# Patient Record
Sex: MALE | Race: OTHER | HISPANIC OR LATINO | URBAN - METROPOLITAN AREA
[De-identification: names, ages, dates, MRNs, and addresses within clinical notes are randomized per-mention and may not be internally consistent; named-entity substitution may affect disease eponyms.]

---

## 2019-09-23 PROBLEM — 426867001 ANORECTAL DISORDER: Status: ACTIVE | Noted: 2019-09-23

## 2019-09-23 PROBLEM — 30037006: Status: ACTIVE | Noted: 2019-09-23

## 2022-06-16 ENCOUNTER — CLAIMS CREATED FROM THE CLAIM WINDOW (OUTPATIENT)
Dept: URBAN - METROPOLITAN AREA CLINIC 33 | Facility: CLINIC | Age: 67
End: 2022-06-16
Payer: MEDICARE

## 2022-06-16 VITALS
WEIGHT: 168 LBS | HEART RATE: 78 BPM | DIASTOLIC BLOOD PRESSURE: 76 MMHG | SYSTOLIC BLOOD PRESSURE: 122 MMHG | OXYGEN SATURATION: 97 % | BODY MASS INDEX: 25.46 KG/M2 | HEIGHT: 68 IN

## 2022-06-16 DIAGNOSIS — R68.81 EARLY SATIETY: ICD-10-CM

## 2022-06-16 DIAGNOSIS — R12 HEARTBURN: ICD-10-CM

## 2022-06-16 DIAGNOSIS — R10.84 GENERALIZED ABDOMINAL PAIN: ICD-10-CM

## 2022-06-16 DIAGNOSIS — F45.8 OTHER SOMATOFORM DISORDERS: ICD-10-CM

## 2022-06-16 DIAGNOSIS — D12.2 ADENOMATOUS POLYP OF ASCENDING COLON: ICD-10-CM

## 2022-06-16 PROBLEM — 428054006: Status: ACTIVE | Noted: 2019-11-10

## 2022-06-16 PROBLEM — 16331000 HEARTBURN: Status: ACTIVE | Noted: 2019-09-23

## 2022-06-16 PROBLEM — 102614006 GENERALIZED ABDOMINAL PAIN: Status: ACTIVE | Noted: 2019-09-23

## 2022-06-16 PROCEDURE — 99214 OFFICE O/P EST MOD 30 MIN: CPT | Performed by: INTERNAL MEDICINE

## 2022-06-16 RX ORDER — FINASTERIDE 5 MG/1
TAKE 1 TABLET BY MOUTH EVERY DAY TABLET, FILM COATED ORAL
Qty: 60 UNSPECIFIED | Refills: 3 | Status: ACTIVE | COMMUNITY

## 2022-06-16 RX ORDER — HYOSCYAMINE SULFATE 0.12 MG/1
1 TABLET AS NEEDED TABLET ORAL
Qty: 60 TABLET | Refills: 1 | Status: ON HOLD | COMMUNITY
Start: 2019-09-23

## 2022-06-16 RX ORDER — OMEPRAZOLE 40 MG/1
TAKE 1 CAPSULE (40 MG TOTAL) BY MOUTH DAILY. MORNING BEFORE MEALS CAPSULE, DELAYED RELEASE ORAL
Qty: 30 UNSPECIFIED | Refills: 1 | Status: ON HOLD | COMMUNITY

## 2022-06-16 RX ORDER — CALCIUM CARBONATE/VITAMIN D3 600 MG-10
1 TABLET TABLET ORAL ONCE A DAY
Status: ACTIVE | COMMUNITY

## 2022-06-16 RX ORDER — LISINOPRIL 10 MG/1
TABLET ORAL
Qty: 90 TABLET | Status: ACTIVE | COMMUNITY

## 2022-06-16 RX ORDER — MULTIVITAMIN
CAPSULE ORAL
Status: ACTIVE | COMMUNITY

## 2022-06-16 RX ORDER — UBIDECARENONE 60 MG
1 CAPSULE WITH A MEAL CAPSULE ORAL ONCE A DAY
Status: ACTIVE | COMMUNITY

## 2022-06-16 RX ORDER — IMIPRAMINE HYDROCHLORIDE 25 MG/1
1 TABLET AT BEDTIME TABLET, FILM COATED ORAL ONCE A DAY
Qty: 30 TABLET | Refills: 1 | Status: ON HOLD | COMMUNITY
Start: 2019-11-12

## 2022-06-16 RX ORDER — ROSUVASTATIN CALCIUM 5 MG/1
TAKE 1 TABLET BY MOUTH EVERY OTHER DAY TABLET, FILM COATED ORAL
Qty: 45 EACH | Refills: 1 | Status: ACTIVE | COMMUNITY

## 2022-06-16 RX ORDER — TRAMADOL HYDROCHLORIDE 50 MG/1
1 TABLET AS NEEDED TABLET, FILM COATED ORAL
Status: ON HOLD | COMMUNITY

## 2022-06-16 RX ORDER — PANTOPRAZOLE SODIUM 40 MG/1
TAKE 1 TABLET BY MOUTH EVERY DAY TABLET, DELAYED RELEASE ORAL
OUTPATIENT

## 2022-06-16 RX ORDER — VITAMIN D 25 MCG
1 TABLET TAB ORAL ONCE A DAY
Status: ACTIVE | COMMUNITY

## 2022-06-16 RX ORDER — LIOTHYRONINE SODIUM 5 UG/1
TAKE 1 TABLET BY MOUTH EVERY DAY TABLET ORAL
Qty: 90 UNSPECIFIED | Refills: 3 | Status: ACTIVE | COMMUNITY

## 2022-06-16 RX ORDER — FAMOTIDINE 20 MG/1
1 TABLET AT BEDTIME  AS NEEDED TABLET, FILM COATED ORAL TWICE A DAY
Qty: 60 | Refills: 1 | Status: ON HOLD | COMMUNITY
Start: 2019-11-07

## 2022-06-16 RX ORDER — METOPROLOL SUCCINATE 50 MG/1
TAKE 1 TABLET BY MOUTH EVERY DAY TABLET, EXTENDED RELEASE ORAL
Qty: 90 EACH | Refills: 0 | Status: ACTIVE | COMMUNITY

## 2022-06-16 RX ORDER — FAMOTIDINE 40 MG/1
1 TABLET AT BEDTIME TABLET, FILM COATED ORAL ONCE A DAY
Qty: 90 TABLET | Refills: 1 | OUTPATIENT

## 2022-06-16 RX ORDER — LEVOTHYROXINE SODIUM 112 UG/1
TAKE 1 TABLET BY MOUTH EVERY DAY TABLET ORAL
Qty: 90 UNSPECIFIED | Refills: 3 | Status: ACTIVE | COMMUNITY

## 2022-06-16 RX ORDER — TRAZODONE HYDROCHLORIDE 50 MG/1
TABLET ORAL AT BEDTIME
Status: ON HOLD | COMMUNITY

## 2022-06-16 RX ORDER — PANTOPRAZOLE SODIUM 40 MG/1
TAKE 1 TABLET BY MOUTH EVERY DAY TABLET, DELAYED RELEASE ORAL
Qty: 30 EACH | Refills: 0 | Status: ACTIVE | COMMUNITY

## 2022-06-16 NOTE — HPI-PERSONAL HISTORY OF COLON POLYPS
Currently admits 1 bowel movement per day with normal and formed stools. Denies melena, blood or mucous in stool, rectal pain/bleeding or pruritus ani.    Last colonoscopy was completed 10/25/2019 revealing Hemorrhoids, Diverticulosis, and a Hyperplastic Rectal Polyp.   He has been placed on a 5 year surveillance due 10/25/2024.

## 2022-06-16 NOTE — HPI-GLOBUS
Patient continue to admit globus sensation throughout the day.     Last visit (11/7/2019)  Patient admits a globus sensation that occur throughout the day. He denies any aggravating or alleviating factors at this time

## 2022-06-16 NOTE — HPI-HEARTBURN
67 y/o male patient presents today for a longstanding history of heartburn that began in 2015. He was previously taking Famotidine 20 mg 1 PO BID without control of symptoms.  He reports Cardiologist Dr. Gan began treatment with Pantoprazole 40 mg 1 QHS 1 year ago due to chest pain related to heartburn with relief.  Admit breakthrough symptoms of chest pain and burning in mid chest with dietary indiscretion.  He will take Tums with relief.  Patient would like to discuss weaning off the Pantoprazole due to documented side effects of liver damage.  Consuming mint candy, tomato sauce and donuts has been an aggravating factor. Admits/denies the continued use of Famotidine still controls his symptoms.  He admits/denies any associated symptoms at this time.   He was evaluated by an ENT early in 2019 at that time he was placed on a medication for heartburn but only took it for 1 week due to severe abdominal pain that occurred after taking the medication.   Patient also tried a vegetable based Antacid as well as Tums, but he prefers not to take Tums.   Last EGD w/ Dil was completed 5/1/2015 revealing mild gastritis, esophagitis, and a small hiatal hernia.

## 2022-06-16 NOTE — HPI-ABDOMINAL PAIN
He denies continued abdominal pain.  Last visit (11/7/2019)  Patient admits constant lower abdominal pain. He describes his pain as a tightness in his stomach. He reports that it feels as if he has done 100 crunches. He reports that this tightness in his stomach is a constant feeling.  He denies any aggravating or alleviating factors at this time

## 2022-06-16 NOTE — HPI-EARLY SATIETY
Continue to admit early satiety,  Last visit (11/7/2019)  Patient admits that he eats several small meals a day and this helps with his early satiety

## 2022-06-17 PROBLEM — 31297008 SOMATOFORM DISORDER: Status: ACTIVE | Noted: 2019-09-23

## 2022-08-11 ENCOUNTER — OFFICE VISIT (OUTPATIENT)
Dept: URBAN - METROPOLITAN AREA CLINIC 33 | Facility: CLINIC | Age: 67
End: 2022-08-11

## 2022-08-11 RX ORDER — FAMOTIDINE 40 MG/1
1 TABLET AT BEDTIME TABLET, FILM COATED ORAL ONCE A DAY
Qty: 90 TABLET | Refills: 1 | OUTPATIENT

## 2022-08-25 ENCOUNTER — OFFICE VISIT (OUTPATIENT)
Dept: URBAN - METROPOLITAN AREA CLINIC 33 | Facility: CLINIC | Age: 67
End: 2022-08-25

## 2023-08-31 ENCOUNTER — OFFICE VISIT (OUTPATIENT)
Dept: URBAN - METROPOLITAN AREA CLINIC 33 | Facility: CLINIC | Age: 68
End: 2023-08-31
Payer: MEDICARE

## 2023-08-31 VITALS
WEIGHT: 171 LBS | HEIGHT: 68 IN | OXYGEN SATURATION: 97 % | DIASTOLIC BLOOD PRESSURE: 84 MMHG | HEART RATE: 67 BPM | BODY MASS INDEX: 25.91 KG/M2 | SYSTOLIC BLOOD PRESSURE: 132 MMHG

## 2023-08-31 DIAGNOSIS — R68.81 EARLY SATIETY: ICD-10-CM

## 2023-08-31 DIAGNOSIS — D12.2 ADENOMATOUS POLYP OF ASCENDING COLON: ICD-10-CM

## 2023-08-31 DIAGNOSIS — K60.2 ANAL FISSURE: ICD-10-CM

## 2023-08-31 DIAGNOSIS — R10.84 GENERALIZED ABDOMINAL PAIN: ICD-10-CM

## 2023-08-31 DIAGNOSIS — F45.8 OTHER SOMATOFORM DISORDERS: ICD-10-CM

## 2023-08-31 DIAGNOSIS — R12 HEARTBURN: ICD-10-CM

## 2023-08-31 PROCEDURE — 99214 OFFICE O/P EST MOD 30 MIN: CPT | Performed by: INTERNAL MEDICINE

## 2023-08-31 RX ORDER — ROSUVASTATIN CALCIUM 5 MG/1
TAKE 1 TABLET BY MOUTH EVERY OTHER DAY TABLET, FILM COATED ORAL
Qty: 45 EACH | Refills: 1 | Status: ACTIVE | COMMUNITY

## 2023-08-31 RX ORDER — UBIDECARENONE 60 MG
1 CAPSULE WITH A MEAL CAPSULE ORAL ONCE A DAY
Status: ACTIVE | COMMUNITY

## 2023-08-31 RX ORDER — FAMOTIDINE 40 MG/1
1 TABLET AT BEDTIME TABLET, FILM COATED ORAL ONCE A DAY
Qty: 90 TABLET | Refills: 1 | OUTPATIENT

## 2023-08-31 RX ORDER — CALCIUM CARBONATE/VITAMIN D3 600 MG-10
1 TABLET TABLET ORAL ONCE A DAY
Status: ACTIVE | COMMUNITY

## 2023-08-31 RX ORDER — LISINOPRIL 10 MG/1
TABLET ORAL
Qty: 90 TABLET | Status: ACTIVE | COMMUNITY

## 2023-08-31 RX ORDER — IMIPRAMINE HYDROCHLORIDE 25 MG/1
1 TABLET AT BEDTIME TABLET, FILM COATED ORAL ONCE A DAY
Qty: 30 TABLET | Refills: 1 | Status: ON HOLD | COMMUNITY
Start: 2019-11-12

## 2023-08-31 RX ORDER — MULTIVITAMIN
CAPSULE ORAL
Status: ACTIVE | COMMUNITY

## 2023-08-31 RX ORDER — METOPROLOL SUCCINATE 50 MG/1
TAKE 1 TABLET BY MOUTH EVERY DAY TABLET, EXTENDED RELEASE ORAL
Qty: 90 EACH | Refills: 0 | Status: ACTIVE | COMMUNITY

## 2023-08-31 RX ORDER — PANTOPRAZOLE SODIUM 40 MG/1
1 TABLET TABLET, DELAYED RELEASE ORAL ONCE A DAY
Status: ACTIVE | COMMUNITY

## 2023-08-31 RX ORDER — OMEPRAZOLE 40 MG/1
TAKE 1 CAPSULE (40 MG TOTAL) BY MOUTH DAILY. MORNING BEFORE MEALS CAPSULE, DELAYED RELEASE ORAL
Qty: 30 UNSPECIFIED | Refills: 1 | Status: ON HOLD | COMMUNITY

## 2023-08-31 RX ORDER — HYOSCYAMINE SULFATE 0.12 MG/1
1 TABLET AS NEEDED TABLET ORAL
Qty: 60 TABLET | Refills: 1 | Status: ON HOLD | COMMUNITY
Start: 2019-09-23

## 2023-08-31 RX ORDER — FAMOTIDINE 40 MG/1
1 TABLET AT BEDTIME TABLET, FILM COATED ORAL ONCE A DAY
Qty: 90 TABLET | Refills: 1 | Status: ACTIVE | COMMUNITY

## 2023-08-31 RX ORDER — CALCIUM CARBONATE 500 MG/1
1 TABLET TABLET ORAL ONCE A DAY
Status: ACTIVE | COMMUNITY

## 2023-08-31 RX ORDER — LEVOTHYROXINE SODIUM 100 UG/1
TAKE 1 TABLET BY MOUTH EVERY DAY TABLET ORAL
Qty: 90 UNSPECIFIED | Refills: 3 | Status: ACTIVE | COMMUNITY

## 2023-08-31 RX ORDER — TRAZODONE HYDROCHLORIDE 50 MG/1
TABLET ORAL AT BEDTIME
Status: ON HOLD | COMMUNITY

## 2023-08-31 RX ORDER — FINASTERIDE 5 MG/1
TAKE 1 TABLET BY MOUTH EVERY DAY TABLET, FILM COATED ORAL
Qty: 60 UNSPECIFIED | Refills: 3 | Status: ACTIVE | COMMUNITY

## 2023-08-31 RX ORDER — VITAMIN D 25 MCG
1 TABLET TAB ORAL ONCE A DAY
Status: ACTIVE | COMMUNITY

## 2023-08-31 RX ORDER — LIOTHYRONINE SODIUM 5 UG/1
TAKE 1 TABLET BY MOUTH EVERY DAY TABLET ORAL
Qty: 90 UNSPECIFIED | Refills: 3 | Status: ACTIVE | COMMUNITY

## 2023-08-31 RX ORDER — TRAMADOL HYDROCHLORIDE 50 MG/1
1 TABLET AS NEEDED TABLET, FILM COATED ORAL
Status: ON HOLD | COMMUNITY

## 2023-08-31 RX ORDER — TADALAFIL 10 MG/1
1 TABLET AS NEEDED TABLET, FILM COATED ORAL ONCE A DAY
Status: ACTIVE | COMMUNITY

## 2023-08-31 RX ORDER — POLYETHYLENE GLYCOL 3350 17 G/17G
AS DIRECTED POWDER, FOR SOLUTION ORAL
OUTPATIENT
Start: 2023-08-31

## 2023-08-31 NOTE — HPI-HEARTBURN
Patient denies current heartburn. He is currently taking Pantoprazole 40 mg, daily with good control of symptoms. Patient uses TUMS for breakthrough heartburn and this helps. He would like to discuss the use of Pantoprazole as he has some concerns. Patient states that as long as he takes the Pantoprazole eveyr day, he does not have any symptoms. If patient tries to stop the Pantoprazole, the heartburn comes back and becomes progressively worse. Patient wonders about the safety of taking Pantoprazole, long term. if he is to stay on daily Pantoprazole, he would like a 90-day supply sent in to the confirmed pharmacy in EMR.   Last visit (06/16/2022) 65 y/o male patient presents today for a longstanding history of heartburn that began in 2015. He was previously taking Famotidine 20 mg 1 PO BID without control of symptoms.  He reports Cardiologist Dr. Gan began treatment with Pantoprazole 40 mg 1 QHS 1 year ago due to chest pain related to heartburn with relief.  Admit breakthrough symptoms of chest pain and burning in mid chest with dietary indiscretion.  He will take Tums with relief.  Patient would like to discuss weaning off the Pantoprazole due to documented side effects of liver damage.  Consuming mint candy, tomato sauce and donuts has been an aggravating factor. Admits/denies the continued use of Famotidine still controls his symptoms.  He admits/denies any associated symptoms at this time.   He was evaluated by an ENT early in 2019 at that time he was placed on a medication for heartburn but only took it for 1 week due to severe abdominal pain that occurred after taking the medication.   Patient also tried a vegetable based Antacid as well as Tums, but he prefers not to take Tums.   Last EGD w/ Dil was completed 5/1/2015 revealing mild gastritis, esophagitis, and a small hiatal hernia.

## 2023-08-31 NOTE — HPI-BLOOD IN STOOL
Patient presents today for blood in his stool, just one incident, about a couple of weeks ago. The blood is just on the tissue after wiping and a little bit on the stool. Patient denies any further rectal bleeding. He denies any rectal bleeding between bowel movements. Patient is currently having 1 bowel movement per day without strain. His stools are normal and formed without the presence of blood, mucus, or melena. Patient has tried Proctogram for the blood in his stool and admits relief. Patient denies rectal pain or pruritus ani, but he admits some rectal discomfort from time to time. Last colonoscopy was completed 10/25/2019 revealing hemorrhoids, diverticulosis, and a hyperplastic rectal polyp. [facility] port[facility] have been checked for labs and no recent labs were found. Patient state sthat he will be seeing his PCP next month and will be getting labwork.

## 2023-08-31 NOTE — HPI-GLOBUS
Patient denies current globus sensation.   Last visit (06/16/2022) Patient continue to admit globus sensation throughout the day.    Last visit (11/7/2019) Patient admits a globus sensation that occur throughout the day. He denies any aggravating or alleviating factors at this time

## 2023-08-31 NOTE — HPI-EARLY SATIETY
Patient continues to deny early satiety.   Last visit (06/16/2022) Continue to admit early satiety,  Last visit (11/7/2019) Patient admits that he eats several small meals a day and this helps with his early satiety

## 2023-08-31 NOTE — PHYSICAL EXAM GASTROINTESTINAL
Abdomen , soft, nontender, nondistended , no guarding or rigidity , no masses palpable , normal bowel sounds , Liver and Spleen,  no hepatosplenomegaly , liver nontender. Anal fissure

## 2023-09-18 ENCOUNTER — TELEPHONE ENCOUNTER (OUTPATIENT)
Dept: URBAN - METROPOLITAN AREA CLINIC 33 | Facility: CLINIC | Age: 68
End: 2023-09-18

## 2023-09-18 RX ORDER — SODIUM, POTASSIUM,MAG SULFATES 17.5-3.13G
177ML SOLUTION, RECONSTITUTED, ORAL ORAL ONCE A DAY
Qty: 354 MILLILITER | Refills: 0 | OUTPATIENT
Start: 2023-09-18 | End: 2023-09-20

## 2023-09-29 ENCOUNTER — TELEPHONE ENCOUNTER (OUTPATIENT)
Dept: URBAN - METROPOLITAN AREA SURGERY CENTER 8 | Facility: SURGERY CENTER | Age: 68
End: 2023-09-29

## 2023-10-04 ENCOUNTER — OUT OF OFFICE VISIT (OUTPATIENT)
Dept: URBAN - METROPOLITAN AREA SURGERY CENTER 8 | Facility: SURGERY CENTER | Age: 68
End: 2023-10-04
Payer: MEDICARE

## 2023-10-04 ENCOUNTER — CLAIMS CREATED FROM THE CLAIM WINDOW (OUTPATIENT)
Dept: URBAN - METROPOLITAN AREA CLINIC 4 | Facility: CLINIC | Age: 68
End: 2023-10-04
Payer: MEDICARE

## 2023-10-04 DIAGNOSIS — K60.3 ANAL FISTULA: ICD-10-CM

## 2023-10-04 DIAGNOSIS — K63.5 COLONIC POLYPS: ICD-10-CM

## 2023-10-04 DIAGNOSIS — Z86.010 COLON POLYP HISTORY: ICD-10-CM

## 2023-10-04 DIAGNOSIS — D12.3 BENIGN NEOPLASM OF TRANSVERSE COLON: ICD-10-CM

## 2023-10-04 DIAGNOSIS — Z86.010 ADENOMAS PERSONAL HISTORY OF COLONIC POLYPS: ICD-10-CM

## 2023-10-04 DIAGNOSIS — D12.3 ADENOMA OF TRANSVERSE COLON: ICD-10-CM

## 2023-10-04 DIAGNOSIS — K64.8 OTHER HEMORRHOIDS: ICD-10-CM

## 2023-10-04 PROCEDURE — 00811 ANES LWR INTST NDSC NOS: CPT | Performed by: NURSE ANESTHETIST, CERTIFIED REGISTERED

## 2023-10-04 PROCEDURE — G8907 PT DOC NO EVENTS ON DISCHARG: HCPCS | Performed by: INTERNAL MEDICINE

## 2023-10-04 PROCEDURE — 45385 COLONOSCOPY W/LESION REMOVAL: CPT | Performed by: INTERNAL MEDICINE

## 2023-10-04 PROCEDURE — 88305 TISSUE EXAM BY PATHOLOGIST: CPT | Performed by: PATHOLOGY

## 2023-10-23 ENCOUNTER — DASHBOARD ENCOUNTERS (OUTPATIENT)
Age: 68
End: 2023-10-23

## 2023-10-26 ENCOUNTER — OFFICE VISIT (OUTPATIENT)
Dept: URBAN - METROPOLITAN AREA CLINIC 33 | Facility: CLINIC | Age: 68
End: 2023-10-26

## 2023-10-26 RX ORDER — POLYETHYLENE GLYCOL 3350 17 G/17G
AS DIRECTED POWDER, FOR SOLUTION ORAL
COMMUNITY
Start: 2023-08-31

## 2023-10-26 RX ORDER — CALCIUM CARBONATE/VITAMIN D3 600 MG-10
1 TABLET TABLET ORAL ONCE A DAY
COMMUNITY

## 2023-10-26 RX ORDER — OMEPRAZOLE 40 MG/1
TAKE 1 CAPSULE (40 MG TOTAL) BY MOUTH DAILY. MORNING BEFORE MEALS CAPSULE, DELAYED RELEASE ORAL
Qty: 30 UNSPECIFIED | Refills: 1 | Status: ON HOLD | COMMUNITY

## 2023-10-26 RX ORDER — FAMOTIDINE 40 MG/1
1 TABLET AT BEDTIME TABLET, FILM COATED ORAL ONCE A DAY
Qty: 90 TABLET | Refills: 1 | OUTPATIENT

## 2023-10-26 RX ORDER — IMIPRAMINE HYDROCHLORIDE 25 MG/1
1 TABLET AT BEDTIME TABLET, FILM COATED ORAL ONCE A DAY
Qty: 30 TABLET | Refills: 1 | Status: ON HOLD | COMMUNITY
Start: 2019-11-12

## 2023-10-26 RX ORDER — LIOTHYRONINE SODIUM 5 UG/1
TAKE 1 TABLET BY MOUTH EVERY DAY TABLET ORAL
Qty: 90 UNSPECIFIED | Refills: 3 | COMMUNITY

## 2023-10-26 RX ORDER — TRAMADOL HYDROCHLORIDE 50 MG/1
1 TABLET AS NEEDED TABLET, FILM COATED ORAL
Status: ON HOLD | COMMUNITY

## 2023-10-26 RX ORDER — TRAZODONE HYDROCHLORIDE 50 MG/1
TABLET ORAL AT BEDTIME
Status: ON HOLD | COMMUNITY

## 2023-10-26 RX ORDER — ROSUVASTATIN CALCIUM 5 MG/1
TAKE 1 TABLET BY MOUTH EVERY OTHER DAY TABLET, FILM COATED ORAL
Qty: 45 EACH | Refills: 1 | COMMUNITY

## 2023-10-26 RX ORDER — VITAMIN D 25 MCG
1 TABLET TAB ORAL ONCE A DAY
COMMUNITY

## 2023-10-26 RX ORDER — LEVOTHYROXINE SODIUM 100 UG/1
TAKE 1 TABLET BY MOUTH EVERY DAY TABLET ORAL
Qty: 90 UNSPECIFIED | Refills: 3 | COMMUNITY

## 2023-10-26 RX ORDER — MULTIVITAMIN
CAPSULE ORAL
COMMUNITY

## 2023-10-26 RX ORDER — FINASTERIDE 5 MG/1
TAKE 1 TABLET BY MOUTH EVERY DAY TABLET, FILM COATED ORAL
Qty: 60 UNSPECIFIED | Refills: 3 | COMMUNITY

## 2023-10-26 RX ORDER — TADALAFIL 10 MG/1
1 TABLET AS NEEDED TABLET, FILM COATED ORAL ONCE A DAY
COMMUNITY

## 2023-10-26 RX ORDER — HYOSCYAMINE SULFATE 0.12 MG/1
1 TABLET AS NEEDED TABLET ORAL
Qty: 60 TABLET | Refills: 1 | Status: ON HOLD | COMMUNITY
Start: 2019-09-23

## 2023-10-26 RX ORDER — FAMOTIDINE 40 MG/1
1 TABLET AT BEDTIME TABLET, FILM COATED ORAL ONCE A DAY
Qty: 90 TABLET | Refills: 1 | COMMUNITY

## 2023-10-26 RX ORDER — METOPROLOL SUCCINATE 50 MG/1
TAKE 1 TABLET BY MOUTH EVERY DAY TABLET, EXTENDED RELEASE ORAL
Qty: 90 EACH | Refills: 0 | COMMUNITY

## 2023-10-26 RX ORDER — LISINOPRIL 10 MG/1
TABLET ORAL
Qty: 90 TABLET | COMMUNITY

## 2023-10-26 RX ORDER — UBIDECARENONE 60 MG
1 CAPSULE WITH A MEAL CAPSULE ORAL ONCE A DAY
COMMUNITY

## 2023-10-26 RX ORDER — PANTOPRAZOLE SODIUM 40 MG/1
1 TABLET TABLET, DELAYED RELEASE ORAL ONCE A DAY
COMMUNITY

## 2023-10-26 RX ORDER — CALCIUM CARBONATE 500 MG/1
1 TABLET TABLET ORAL ONCE A DAY
COMMUNITY

## 2023-10-26 RX ORDER — POLYETHYLENE GLYCOL 3350 17 G/17G
AS DIRECTED POWDER, FOR SOLUTION ORAL
OUTPATIENT

## 2023-10-26 NOTE — HPI-BLOOD IN STOOL
Patient admits/denies any rectal bleeding episodes. He is currently having --- bowel movements per day with/out strain. His stools are ---, with/out the presence of blood, mucus, or melena. Patient admits/denies pruritus ani or rectal pain.  Last visit (08/31/2023) Patient presents today for blood in his stool, just one incident, about a couple of weeks ago. The blood is just on the tissue after wiping and a little bit on the stool. Patient denies any further rectal bleeding. He denies any rectal bleeding between bowel movements. Patient is currently having 1 bowel movement per day without strain. His stools are normal and formed without the presence of blood, mucus, or melena. Patient has tried Proctogram for the blood in his stool and admits relief. Patient denies rectal pain or pruritus ani, but he admits some rectal discomfort from time to time. Last colonoscopy was completed 10/25/2019 revealing hemorrhoids, diverticulosis, and a hyperplastic rectal polyp. [facility] portals have been checked for labs and no recent labs were found. Patient states that he will be seeing his PCP next month and will be getting labwork.

## 2023-10-26 NOTE — HPI-HEARTBURN
Patient is/is not currently taking Famotidine 40 mg, daily. He admits/denies breakthrough heartburn. Patient admits/denies any associated symptoms. He reports ---.  Last visit (08/31/2023) Patient denies current heartburn. He is currently taking Pantoprazole 40 mg, daily with good control of symptoms. Patient uses TUMS for breakthrough heartburn and this helps. He would like to discuss the use of Pantoprazole as he has some concerns. Patient states that as long as he takes the Pantoprazole every day, he does not have any symptoms. If patient tries to stop the Pantoprazole, the heartburn comes back and becomes progressively worse. Patient wonders about the safety of taking Pantoprazole, long term. if he is to stay on daily Pantoprazole, he would like a 90-day supply sent in to the confirmed pharmacy in EMR.   Last visit (06/16/2022) 65 y/o male patient presents today for a longstanding history of heartburn that began in 2015. He was previously taking Famotidine 20 mg 1 PO BID without control of symptoms.  He reports Cardiologist Dr. Gan began treatment with Pantoprazole 40 mg 1 QHS 1 year ago due to chest pain related to heartburn with relief.  Admit breakthrough symptoms of chest pain and burning in mid chest with dietary indiscretion.  He will take Tums with relief.  Patient would like to discuss weaning off the Pantoprazole due to documented side effects of liver damage.  Consuming mint candy, tomato sauce and donuts has been an aggravating factor. Admits/denies the continued use of Famotidine still controls his symptoms.  He admits/denies any associated symptoms at this time.   He was evaluated by an ENT early in 2019 at that time he was placed on a medication for heartburn but only took it for 1 week due to severe abdominal pain that occurred after taking the medication.   Patient also tried a vegetable based Antacid as well as Tums, but he prefers not to take Tums.   Last EGD w/ Dil was completed 5/1/2015 revealing mild gastritis, esophagitis, and a small hiatal hernia.

## 2023-10-26 NOTE — HPI-COLONOSCOPY FOLLOWUP
Patient presents today for a follow-up from the colonoscopy that was done on 10/04/2023. Since having the procedure, patient is having --- bowel movements per--. Stools are --- and  with/without strain. Patient denies blood or mucous in his stool. He denies melena. Patient admits/denies any complications following the procedure.

## 2024-01-25 ENCOUNTER — OFFICE VISIT (OUTPATIENT)
Dept: URBAN - METROPOLITAN AREA CLINIC 33 | Facility: CLINIC | Age: 69
End: 2024-01-25